# Patient Record
(demographics unavailable — no encounter records)

---

## 2025-01-15 NOTE — PHYSICAL EXAM
[FreeTextEntry1] : NAD A&Ox3 Non obese Inspection Left Hip PROM at least 70' IR and 55-60' ER Left Quadriceps atrophy (static) Left patella subluxation (static) DTR's: 2+ right patella; absent left patella (static); 2+ b/l Achilles MMT: 2+/5 L HF (supine); 3+/5 L KE (static); 4+/5 L ADD (static); 5/5 R DF; 4+/5 L DF (improved); 5/5 B/L PF (static) Sensation: intact PP deep peroneal and sural nerve distributions; hyperalgesia left lower saphenous nerve distribution and hypoesthesia femoral nerve distribution (static) Cap refill < 3 secs Calves supple Right Foot 2nd toe increased PP dorsum and plantar aspect  + Squeeze test

## 2025-01-15 NOTE — ASSESSMENT
[FreeTextEntry1] : 56 y.o. 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  I spent most of today's office visit (30 min) reviewing and performing the patient's US examination right foot, discussing etiology, pathogenesis, further diagnostic work-up and non-operative management.  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  We will obtain an MRI scan right foot to confirm.  Depending upon the results of the patient's MRI, we may then consider US guided aspiration and injection.  In review, after patient's initial hip dislocation in 2018, she sustained tearing of her left hip joint capsule, ligamentum teres and near circumferential tearing of the labrum. There was also full-thickness rupture of the iliopsoas tendon; however, the fat planes around the femoral nerve were maintained. Based upon the initial severe muscle atrophy of the patient's psoas, iliacus, sartorius, rectus femoris and tensor fascia zaire, as well as the left vastus muscles w/ accompanying abnormal T2 hyperintensity and haziness of the left femoral nerve w/ possible narrowing of the nerve in the left inguinal region, there was high suspicion for a sub-acute/chronic neurogenic injury. 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 There was no involvement of the obturator innervated muscles.    Although the patient's static hip and knee extension muscle strength has improved, she still does not have antigravity strength which places her at risk for re-dislocation and further orthopedic injury.   I advised the patient to continue wearing her articulated knee brace when active to prevent falls and fracture injuries.  We discussed the risks, benefits and alternatives to an ultrasound-guided femoral nerve hydrodissection which we have both deferred as the numbness in the patient's left leg is not terribly bothersome.   The patient is in agreement with the plan.  All questions have been answered.  Return to office 1 to 2 weeks for MRI review.

## 2025-01-15 NOTE — HISTORY OF PRESENT ILLNESS
[FreeTextEntry1] : 56 y.o. 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  Pt. states that she stopped wearing her brace as it was too heavy for everyday use.  She stopped P.T. in Aug 2024 and has transitioned to a gym based HEP.  Pt. experiences intermittent buckling left knee depending upon her activity level.  No recent falls.  Still has numbness in anterior thigh past knee into shin.

## 2025-01-15 NOTE — DATA REVIEWED
[MRI] : MRI [FreeTextEntry1] : US GUIDED LEFT FEMORAL NCS + NEEDLE EMG LEFT LE (6/28/23): Results consistent with a subacute to chronic, axonal, femoral mononeuropathy above the level of the inguinal ligament. Compared to the patient's initial electrodiagnostic evaluation on November 25, 2022, the femoral nerve is now excitable and shows evidence of axonal reinnervation.  EMG/NCS LEFT LE (11/25/22): Results consistent with a relatively acute, severe, femoral mononeuropathy above the level of the inguinal ligament characterized by diffuse denervation potentials in the quadriceps muscles and absent near-nerve stimulation under US guidance.  MRI LEFT HIP (Oct 2022): 1. Severe muscle atrophy of the left psoas, iliacus, sartorius, rectus femoris and tensor fascia zaire, as well as the left vastus muscles which are partly imaged in the upper thigh. Mild edema within the rectus femoris and sartorius muscles are also present.  Accompanying abnormal T2 hyperintensity and haziness of the left femoral nerve as detailed above, with possible narrowing of the caliber of the nerve in the left inguinal region, at the site of extensive infiltration of the fat by subcutaneous edema, findings which may be related to reported recent hip dislocation in October 2022.  The extent of severe muscle atrophy favors a more chronic injury, but an acute on chronic timeline with possible entrapment of femoral nerve branches in the inguinal region is possible, would be related to the posttraumatic inflammation/scarring in that area.  2. Recent posterior hip dislocation by history, with extensive tear of the posterior labrum and degenerative tear of the anterosuperior labrum with moderate adjacent cartilage thinning.Small left hip effusion with pericapsular edema.  3. Mild bone marrow contusion of the posterior femoral head.  4. Mild insertional gluteus medius tendinosis.  MRI LEFT HIP (Nov 2018): 1. Findings compatible with recent hip dislocation with tearing of the hip joint capsule, ligamentum teres, and near circumferential tearing of the glenoid labrum. Full-thickness rupture of the iliopsoas tendon. No fracture or chondral injury.  2. Tendinopathy of the gluteus medius tendon with tendinopathy and partial-thickness tearing of the hamstring tendons.  3. Shallow left acetabulum.  4. Degenerative change of the lower lumbar spine.  Fat planes about the femoral and sciatic nerves are maintained and there is no muscle atrophy

## 2025-01-29 NOTE — DATA REVIEWED
[MRI] : MRI [FreeTextEntry1] : MRI Right Foot (1/24/25): Partial tearing of the medial aspect of the plantar plate at the second metatarsophalangeal joint.  Intermetatarsal neuromas in the second and third web spaces.  Diffuse soft tissue edema around the foot, most pronounced on the dorsal aspect.  US GUIDED LEFT FEMORAL NCS + NEEDLE EMG LEFT LE (6/28/23): Results consistent with a subacute to chronic, axonal, femoral mononeuropathy above the level of the inguinal ligament. Compared to the patient's initial electrodiagnostic evaluation on November 25, 2022, the femoral nerve is now excitable and shows evidence of axonal reinnervation.  EMG/NCS LEFT LE (11/25/22): Results consistent with a relatively acute, severe, femoral mononeuropathy above the level of the inguinal ligament characterized by diffuse denervation potentials in the quadriceps muscles and absent near-nerve stimulation under US guidance.  MRI LEFT HIP (Oct 2022): 1. Severe muscle atrophy of the left psoas, iliacus, sartorius, rectus femoris and tensor fascia zaire, as well as the left vastus muscles which are partly imaged in the upper thigh. Mild edema within the rectus femoris and sartorius muscles are also present.  Accompanying abnormal T2 hyperintensity and haziness of the left femoral nerve as detailed above, with possible narrowing of the caliber of the nerve in the left inguinal region, at the site of extensive infiltration of the fat by subcutaneous edema, findings which may be related to reported recent hip dislocation in October 2022.  The extent of severe muscle atrophy favors a more chronic injury, but an acute on chronic timeline with possible entrapment of femoral nerve branches in the inguinal region is possible, would be related to the posttraumatic inflammation/scarring in that area.  2. Recent posterior hip dislocation by history, with extensive tear of the posterior labrum and degenerative tear of the anterosuperior labrum with moderate adjacent cartilage thinning.Small left hip effusion with pericapsular edema.  3. Mild bone marrow contusion of the posterior femoral head.  4. Mild insertional gluteus medius tendinosis.  MRI LEFT HIP (Nov 2018): 1. Findings compatible with recent hip dislocation with tearing of the hip joint capsule, ligamentum teres, and near circumferential tearing of the glenoid labrum. Full-thickness rupture of the iliopsoas tendon. No fracture or chondral injury.  2. Tendinopathy of the gluteus medius tendon with tendinopathy and partial-thickness tearing of the hamstring tendons.  3. Shallow left acetabulum.  4. Degenerative change of the lower lumbar spine.  Fat planes about the femoral and sciatic nerves are maintained and there is no muscle atrophy

## 2025-01-29 NOTE — ASSESSMENT
[FreeTextEntry1] : 56 y.o. 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  I spent most of today's office visit (30 min) reviewing the patient's MRI right foot, re-reviewing her US examination, discussing etiology, pathogenesis and further non-operative management.  MRI right foot significant for (1) partial tearing of the medial aspect of the plantar plate at the second metatarsophalangeal joint; (2) intermetatarsal neuromas in the second and third web spaces; (3) diffuse soft tissue edema around the foot, most pronounced on the dorsal aspect.  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  I explained to the patient that the interdigital neuromas may be too small to inject.  An ultrasound-guided PRP injection to the plantar plate at the second metatarsal phalangeal joint may be considered but it is difficult to say if the patient would tolerate an orthopedic walking boot/shoe post procedure.  I advised a consultation with one of my colleagues in the [facility] service but doubt they would consider her an operative candidate.  In review, after patient's initial hip dislocation in 2018, she sustained tearing of her left hip joint capsule, ligamentum teres and near circumferential tearing of the labrum. There was also full-thickness rupture of the iliopsoas tendon; however, the fat planes around the femoral nerve were maintained. Based upon the initial severe muscle atrophy of the patient's psoas, iliacus, sartorius, rectus femoris and tensor fascia zaire, as well as the left vastus muscles w/ accompanying abnormal T2 hyperintensity and haziness of the left femoral nerve w/ possible narrowing of the nerve in the left inguinal region, there was high suspicion for a sub-acute/chronic neurogenic injury. 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 There was no involvement of the obturator innervated muscles.    Although the patient's static hip and knee extension muscle strength has improved, she still does not have antigravity strength which places her at risk for re-dislocation and further orthopedic injury.   I advised the patient to continue wearing her articulated knee brace when active to prevent falls and fracture injuries.  We discussed the risks, benefits and alternatives to an ultrasound-guided femoral nerve hydrodissection which the patient will consider, especially now that she is experiencing painful nocturnal cramping in her thigh and leg.  The patient is in agreement with the plan.  All questions have been answered.  The patient will call the office and let us know how she wishes to proceed.  This patient is being managed for a complex chronic pain condition that requires ongoing medical management. The nature of this condition requires a longitudinal relationship and monitoring over time for appropriate treatment

## 2025-01-29 NOTE — HISTORY OF PRESENT ILLNESS
[FreeTextEntry1] : 56 y.o. 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  Results detailed below.  Pt. states that her right 2nd toe plantar aspect discomfort and numbness is still present, worsens with increased activity and walking.  No other toes get numb.  Pt. also c/o painful cramping in left anterior worse at night.  Sxs can extend down front of thigh past knee into shin and calf.  Feels as if a "tendon behind her knee gets stuck", especially when sitting.